# Patient Record
Sex: MALE | Race: WHITE | NOT HISPANIC OR LATINO
[De-identification: names, ages, dates, MRNs, and addresses within clinical notes are randomized per-mention and may not be internally consistent; named-entity substitution may affect disease eponyms.]

---

## 2022-09-06 ENCOUNTER — APPOINTMENT (OUTPATIENT)
Dept: ORTHOPEDIC SURGERY | Facility: CLINIC | Age: 42
End: 2022-09-06

## 2022-09-06 VITALS — BODY MASS INDEX: 30.46 KG/M2 | HEIGHT: 75 IN | WEIGHT: 245 LBS

## 2022-09-06 DIAGNOSIS — Z78.9 OTHER SPECIFIED HEALTH STATUS: ICD-10-CM

## 2022-09-06 DIAGNOSIS — S46.912A STRAIN OF UNSPECIFIED MUSCLE, FASCIA AND TENDON AT SHOULDER AND UPPER ARM LEVEL, LEFT ARM, INITIAL ENCOUNTER: ICD-10-CM

## 2022-09-06 DIAGNOSIS — Z87.891 PERSONAL HISTORY OF NICOTINE DEPENDENCE: ICD-10-CM

## 2022-09-06 PROBLEM — Z00.00 ENCOUNTER FOR PREVENTIVE HEALTH EXAMINATION: Status: ACTIVE | Noted: 2022-09-06

## 2022-09-06 PROCEDURE — 73010 X-RAY EXAM OF SHOULDER BLADE: CPT | Mod: LT

## 2022-09-06 PROCEDURE — 99204 OFFICE O/P NEW MOD 45 MIN: CPT

## 2022-09-06 PROCEDURE — 73030 X-RAY EXAM OF SHOULDER: CPT | Mod: LT

## 2022-09-06 RX ORDER — DICLOFENAC SODIUM 75 MG/1
75 TABLET, DELAYED RELEASE ORAL TWICE DAILY
Qty: 60 | Refills: 3 | Status: ACTIVE | COMMUNITY
Start: 2022-09-06 | End: 1900-01-01

## 2022-09-06 NOTE — ASSESSMENT
[FreeTextEntry1] : L Biceps/labrum complex injury.\par Trial of PT.\par Diclofenac sent.\par Consider GH injection.\par Consider MRI.\par RTO 6 weeks.\par

## 2022-09-06 NOTE — HISTORY OF PRESENT ILLNESS
[Gradual] : gradual [7] : 7 [3] : 3 [Dull/Aching] : dull/aching [Occasional] : occasional [Rest] : rest [Exercising] : exercising [] : no [FreeTextEntry1] : LT SHOULDER [FreeTextEntry5] : PT STATED HE HAS BEEN HAVING SLIGHT AMOUNT OF PAIN IN LEFT SHOULDER FOR SEVERAL MONTHS OVER THE PAST WEEK PAIN HAS PROGRESSIVELY ELEVATED [FreeTextEntry7] : SHOULDER TO ELBOW [de-identified] : 40 y/o LHD male here today for L shoulder pain.  Pain increased 3 days ago, no specific injury.  Pain is lateral and deep, some into posterior arm to elbow. He tried ibuprofen.  Denies neck pain, paresthesias.

## 2022-09-06 NOTE — HISTORY OF PRESENT ILLNESS
[Gradual] : gradual [7] : 7 [3] : 3 [Dull/Aching] : dull/aching [Occasional] : occasional [Rest] : rest [Exercising] : exercising [] : no [FreeTextEntry1] : LT SHOULDER [FreeTextEntry5] : PT STATED HE HAS BEEN HAVING SLIGHT AMOUNT OF PAIN IN LEFT SHOULDER FOR SEVERAL MONTHS OVER THE PAST WEEK PAIN HAS PROGRESSIVELY ELEVATED [FreeTextEntry7] : SHOULDER TO ELBOW [de-identified] : 40 y/o LHD male here today for L shoulder pain.  Pain increased 3 days ago, no specific injury.  Pain is lateral and deep, some into posterior arm to elbow. He tried ibuprofen.  Denies neck pain, paresthesias.  left-side extremities/moderately increased tone

## 2022-09-06 NOTE — PHYSICAL EXAM
[Left] : left shoulder [5 ___] : forward flexion 5[unfilled]/5 [5___] : internal rotation 5[unfilled]/5 [] : motor and sensory intact distally [FreeTextEntry9] : full ROM

## 2022-09-06 NOTE — HISTORY OF PRESENT ILLNESS
[Gradual] : gradual [7] : 7 [3] : 3 [Dull/Aching] : dull/aching [Occasional] : occasional [Rest] : rest [Exercising] : exercising [] : no [FreeTextEntry1] : LT SHOULDER [FreeTextEntry5] : PT STATED HE HAS BEEN HAVING SLIGHT AMOUNT OF PAIN IN LEFT SHOULDER FOR SEVERAL MONTHS OVER THE PAST WEEK PAIN HAS PROGRESSIVELY ELEVATED [FreeTextEntry7] : SHOULDER TO ELBOW [de-identified] : 40 y/o LHD male here today for L shoulder pain.  Pain increased 3 days ago, no specific injury.  Pain is lateral and deep, some into posterior arm to elbow. He tried ibuprofen.  Denies neck pain, paresthesias.

## 2022-10-18 ENCOUNTER — APPOINTMENT (OUTPATIENT)
Dept: ORTHOPEDIC SURGERY | Facility: CLINIC | Age: 42
End: 2022-10-18

## 2022-10-18 VITALS — WEIGHT: 245 LBS | HEIGHT: 75 IN | BODY MASS INDEX: 30.46 KG/M2

## 2022-10-18 PROCEDURE — 99213 OFFICE O/P EST LOW 20 MIN: CPT

## 2022-10-18 NOTE — HISTORY OF PRESENT ILLNESS
[8] : 8 [0] : 0 [de-identified] : 10/18/22: Here for follow up. He did not start PT due to pain. He has been trying to rest and take diclofenac. He has pain with reaching. \par \par 9/6/22: 40 y/o LHD male here today for the L shoulder.  He has occasional shoulder pain, playing volleyball.  He reports 3 days ago the pain increased.  No specific injury.  He feels pain posterior and lateral, some into the upper arm and posterior elbow.  Denies paresthesias, neck pain.  He is taking ibuprofen.  [FreeTextEntry1] : lt shoulder [de-identified] : did not go to PT

## 2022-10-18 NOTE — ASSESSMENT
[FreeTextEntry1] : L Biceps/labrum complex injury.\par He will start PT.\par Diclofenac sent.\par He does not want a GH injection.\par Consider MRI if not improved.\par RTO 6 weeks.\par

## 2022-10-18 NOTE — PHYSICAL EXAM
[Left] : left shoulder [5 ___] : forward flexion 5[unfilled]/5 [5___] : internal rotation 5[unfilled]/5 [] : no atrophy

## 2022-11-29 ENCOUNTER — FORM ENCOUNTER (OUTPATIENT)
Age: 42
End: 2022-11-29

## 2022-11-29 ENCOUNTER — APPOINTMENT (OUTPATIENT)
Dept: ORTHOPEDIC SURGERY | Facility: CLINIC | Age: 42
End: 2022-11-29

## 2022-11-29 VITALS — HEIGHT: 75 IN | BODY MASS INDEX: 30.46 KG/M2 | WEIGHT: 245 LBS

## 2022-11-29 DIAGNOSIS — M75.22 BICIPITAL TENDINITIS, LEFT SHOULDER: ICD-10-CM

## 2022-11-29 PROCEDURE — 99213 OFFICE O/P EST LOW 20 MIN: CPT

## 2022-11-29 NOTE — HISTORY OF PRESENT ILLNESS
[8] : 8 [0] : 0 [de-identified] : 11/29/22: Here for follow up. He states he tried PT but it increased pain. He has pain and fatigue lifting overhead. \par \par 10/18/22: Here for follow up. He did not start PT due to pain. He has been trying to rest and take diclofenac. He has pain with reaching. \par \par 9/6/22: 42 y/o LHD male here today for the L shoulder.  He has occasional shoulder pain, playing volleyball.  He reports 3 days ago the pain increased.  No specific injury.  He feels pain posterior and lateral, some into the upper arm and posterior elbow.  Denies paresthesias, neck pain.  He is taking ibuprofen.  [] : no [FreeTextEntry1] : lt shoulder [de-identified] : did not go to PT

## 2022-11-29 NOTE — ASSESSMENT
[FreeTextEntry1] : L Biceps/labrum complex injury.\par PT worsened symptoms.\par Diclofenac sent.\par He does not want a GH injection.\par L shoulder MRI to eval labral tear.\par RTO after MRI.\par

## 2022-11-30 ENCOUNTER — APPOINTMENT (OUTPATIENT)
Dept: MRI IMAGING | Facility: CLINIC | Age: 42
End: 2022-11-30

## 2022-11-30 PROCEDURE — 73221 MRI JOINT UPR EXTREM W/O DYE: CPT | Mod: LT

## 2022-12-06 ENCOUNTER — APPOINTMENT (OUTPATIENT)
Dept: ORTHOPEDIC SURGERY | Facility: CLINIC | Age: 42
End: 2022-12-06

## 2022-12-06 DIAGNOSIS — M19.012 PRIMARY OSTEOARTHRITIS, LEFT SHOULDER: ICD-10-CM

## 2022-12-06 DIAGNOSIS — S43.432D SUPERIOR GLENOID LABRUM LESION OF LEFT SHOULDER, SUBSEQUENT ENCOUNTER: ICD-10-CM

## 2022-12-06 PROCEDURE — 99214 OFFICE O/P EST MOD 30 MIN: CPT

## 2022-12-06 NOTE — ASSESSMENT
[FreeTextEntry1] : L Biceps/labrum complex injury. L GH DJD.\par MRI reviewed - Moderate GH DJD with cystic changes, RC tendonitis, diffuse labral tearing, bursitis.\par PT worsened symptoms.\par Diclofenac prn.\par He does not want a GH injection.\par Discussed possible Orthovisc series.\par Activity modification.\par RTO prn.\par

## 2022-12-06 NOTE — HISTORY OF PRESENT ILLNESS
[8] : 8 [0] : 0 [de-identified] : 12/6/22: Here to review MRI.\par \par MRI left shoulder:\par 1. AC joint arthrosis with spurring, ununited os acromiale. No narrowing of the supraspinatus outlet.\par 2. Infraspinatus tendinopathy and fraying with low-grade articular tear within the fraying at the posterior\par insertion with traction edema at the humeral head. No tear or fracture.\par 3. Supraspinatus tendinopathy with insertional fraying.\par 4. Diffuse tear of the superior labrum and posterior inferior labrum. Biceps tendinopathy extending through the anchor with interstitial tearing at the anchor and tenosynovitis.\par 5. Moderate glenohumeral arthrosis with posterior subluxation of the humeral head and cystic changes at posterior glenoid with largest 10 mm with minimal extraosseous extension of the cyst.\par 6. Joint effusion with synovitis.\par \par 11/29/22: Here for follow up. He states he tried PT but it increased pain. He has pain and fatigue lifting overhead. \par \par 10/18/22: Here for follow up. He did not start PT due to pain. He has been trying to rest and take diclofenac. He has pain with reaching. \par \par 9/6/22: 42 y/o LHD male here today for the L shoulder.  He has occasional shoulder pain, playing volleyball.  He reports 3 days ago the pain increased.  No specific injury.  He feels pain posterior and lateral, some into the upper arm and posterior elbow.  Denies paresthesias, neck pain.  He is taking ibuprofen.  [] : no [FreeTextEntry1] : lt shoulder [de-identified] : did not go to PT

## 2022-12-06 NOTE — DATA REVIEWED
[MRI] : MRI [Left] : left [Shoulder] : shoulder [I independently reviewed and interpreted images and report] : I independently reviewed and interpreted images and report [FreeTextEntry1] : Moderate GH DJD with cystic changes, RC tendonitis, diffuse labral tearing, bursitis